# Patient Record
Sex: FEMALE | Race: WHITE | URBAN - METROPOLITAN AREA
[De-identification: names, ages, dates, MRNs, and addresses within clinical notes are randomized per-mention and may not be internally consistent; named-entity substitution may affect disease eponyms.]

---

## 2022-05-04 PROBLEM — F41.1 GENERALIZED ANXIETY DISORDER: Status: ACTIVE | Noted: 2022-05-04

## 2022-05-06 ENCOUNTER — OFFICE VISIT (OUTPATIENT)
Dept: INTERNAL MEDICINE CLINIC | Age: 27
End: 2022-05-06
Payer: COMMERCIAL

## 2022-05-06 VITALS
HEART RATE: 109 BPM | DIASTOLIC BLOOD PRESSURE: 83 MMHG | BODY MASS INDEX: 25.86 KG/M2 | WEIGHT: 155.2 LBS | TEMPERATURE: 97.1 F | RESPIRATION RATE: 18 BRPM | OXYGEN SATURATION: 99 % | HEIGHT: 65 IN | SYSTOLIC BLOOD PRESSURE: 121 MMHG

## 2022-05-06 DIAGNOSIS — M25.531 WRIST PAIN, CHRONIC, RIGHT: Primary | ICD-10-CM

## 2022-05-06 DIAGNOSIS — G89.29 WRIST PAIN, CHRONIC, RIGHT: Primary | ICD-10-CM

## 2022-05-06 DIAGNOSIS — M77.9 TENDONITIS: ICD-10-CM

## 2022-05-06 PROCEDURE — 99203 OFFICE O/P NEW LOW 30 MIN: CPT | Performed by: INTERNAL MEDICINE

## 2022-05-06 RX ORDER — DEXTROAMPHETAMINE SACCHARATE, AMPHETAMINE ASPARTATE, DEXTROAMPHETAMINE SULFATE AND AMPHETAMINE SULFATE 5; 5; 5; 5 MG/1; MG/1; MG/1; MG/1
TABLET ORAL
COMMUNITY

## 2022-05-06 RX ORDER — ZOLPIDEM TARTRATE 10 MG/1
TABLET ORAL
COMMUNITY

## 2022-05-06 RX ORDER — DICLOFENAC SODIUM 50 MG/1
50 TABLET, DELAYED RELEASE ORAL 2 TIMES DAILY
Qty: 30 TABLET | Refills: 0 | Status: SHIPPED | OUTPATIENT
Start: 2022-05-06 | End: 2022-05-21

## 2022-05-06 NOTE — PROGRESS NOTES
Progress Note    Patient: Yudy White               Sex: female                  YOB: 1995      Age:  32 y.o.                    HPI:     Yudy White is a 32 y.o. female who has been seen for  establishment of care . She has  Issues with anxiety . She is here today  because of wrist pain  She was snowboarding  and fell numerous times . She has had pain since then . When she types , plays golf . She has pain when she has to push herself up from seated position     Past Medical History:   Diagnosis Date    Alcohol abuse     Generalized anxiety disorder 5/4/2022       History reviewed. No pertinent surgical history. Family History   Problem Relation Age of Onset   Barba Migraines Mother     Hypertension Maternal Grandmother        Social History     Socioeconomic History    Marital status: SINGLE   Tobacco Use    Smoking status: Never Smoker    Smokeless tobacco: Never Used   Vaping Use    Vaping Use: Some days    Substances: Nicotine, Flavoring    Devices: Disposable, Pre-filled pod   Substance and Sexual Activity    Alcohol use: Yes    Drug use: No    Sexual activity: Not Currently     Social Determinants of Health     Financial Resource Strain: Low Risk     Difficulty of Paying Living Expenses: Not very hard   Food Insecurity: No Food Insecurity    Worried About Running Out of Food in the Last Year: Never true    Ran Out of Food in the Last Year: Never true   Physical Activity: Insufficiently Active    Days of Exercise per Week: 1 day    Minutes of Exercise per Session: 30 min         Current Outpatient Medications:     dextroamphetamine-amphetamine (ADDERALL) 20 mg tablet, dextroamphetamine-amphetamine 20 mg tablet, Disp: , Rfl:     zolpidem (AMBIEN) 10 mg tablet, zolpidem 10 mg tablet, Disp: , Rfl:     sertraline (ZOLOFT) 50 mg tablet, Take  by mouth daily. , Disp: , Rfl:     norgestimate-ethinyl estradiol (TRINESSA, 28,) 0.18/0.215/0.25 mg-35 mcg (28) tablet, Take 1 tablet by mouth daily. , Disp: 1 Package, Rfl: 0    phenazopyridine (PYRIDIUM) 95 mg tab, Take 1 tablet by mouth three (3) times daily as needed for Pain., Disp: 30 tablet, Rfl: 3     No Known Allergies    ROS     Physical Exam:      There were no vitals taken for this visit. Physical Exam  Musculoskeletal:         General: Tenderness present. Comments: Tender volar aspect  Rt wrist . Pain on active movement of wrist against resistance in all directions. Labs Reviewed:      Assessment/Plan       ICD-10-CM ICD-9-CM    1. Wrist pain, chronic, right  M25.531 719.43 XR WRIST RT AP/LAT/OBL MIN 3V    G89.29 338.29    2.  Tendonitis  M77.9 726.90              Michel Bergeron MD

## 2022-05-06 NOTE — PROGRESS NOTES
1. \"Have you been to the ER, urgent care clinic since your last visit? Hospitalized since your last visit? \" [facility]-02/04/2022/ Post Concussive syndrome    2. \"Have you seen or consulted any other health care providers outside of the [address] [place] [address] since your last visit? \" No     3. For patients aged 39-70: Has the patient had a colonoscopy / FIT/ Cologuard? NA - based on age      If the patient is female:    4. For patients aged 41-77: Has the patient had a mammogram within the past 2 years? NA - based on age or sex      11. For patients aged 21-65: Has the patient had a pap smear?  No